# Patient Record
Sex: MALE
[De-identification: names, ages, dates, MRNs, and addresses within clinical notes are randomized per-mention and may not be internally consistent; named-entity substitution may affect disease eponyms.]

---

## 2024-02-27 PROBLEM — Z00.00 ENCOUNTER FOR PREVENTIVE HEALTH EXAMINATION: Status: ACTIVE | Noted: 2024-02-27

## 2024-02-29 ENCOUNTER — APPOINTMENT (OUTPATIENT)
Dept: ORTHOPEDIC SURGERY | Facility: CLINIC | Age: 54
End: 2024-02-29
Payer: COMMERCIAL

## 2024-02-29 VITALS
BODY MASS INDEX: 27.28 KG/M2 | WEIGHT: 180 LBS | HEART RATE: 103 BPM | OXYGEN SATURATION: 96 % | SYSTOLIC BLOOD PRESSURE: 135 MMHG | DIASTOLIC BLOOD PRESSURE: 92 MMHG | HEIGHT: 68 IN

## 2024-02-29 PROCEDURE — 72050 X-RAY EXAM NECK SPINE 4/5VWS: CPT

## 2024-02-29 PROCEDURE — 99204 OFFICE O/P NEW MOD 45 MIN: CPT

## 2024-03-05 NOTE — HISTORY OF PRESENT ILLNESS
[de-identified] : Pt presents with neck pain radiating to left shoulder and arm.  On 1/26/2024, he spun his head around and passed out on the floor briefly.  Developed left arm tingling.  Now has 6/10 pain in L arm with tingling in thumb and index finger.  Neck pain 3/10.  His left arm is weaker in PT.  Goes to gym 5-6 days a week, no overhead work.  Sees chiropractor.  Saw Dr Olmstead at [facility], hnp C5-6.  Took medrol dosepak, pain subsided.  Did PT x 5 sessions with relief.  Takes no pain meds.

## 2024-03-05 NOTE — DISCUSSION/SUMMARY
[Surgical risks reviewed] : Surgical risks reviewed [de-identified] : A lengthy discussion was held with the patient regarding his condition.  We discussed nonoperative treatment strategies including physical therapy, pharmacologic management and steroid injections.  We discussed surgical options (acdf C5-7) and the attendant benefits, alternatives, and risks, including but not limited to infection, bleeding, persistent pain, persistent neurologic deficit, neurologic injury, adjacent segment degeneration, and the need for future surgery.  The patient's questions were sought and answered satisfactorily. As pt has improved over past 6 weeks, will observe x 1 month.   I, Agatha Andrea NP am acting as a scribe for Dr. Frank Schwab.

## 2024-03-05 NOTE — PHYSICAL EXAM
[de-identified] : NVI, except L wrist flexor 4/5, biceps 4/5, triceps 3+/5. Neck pain with extension. [de-identified] : MRI C spine 2/6/2024:  C5-6 HNP, C5-6, C6-7 left foraminal stenosis  Cervical xrays with flex/ext 2/29/2024:  C6 inferior anterior osteophyte, small spondylolisthesis C6-7

## 2024-03-28 ENCOUNTER — APPOINTMENT (OUTPATIENT)
Dept: ORTHOPEDIC SURGERY | Facility: CLINIC | Age: 54
End: 2024-03-28
Payer: COMMERCIAL

## 2024-03-28 VITALS
DIASTOLIC BLOOD PRESSURE: 92 MMHG | WEIGHT: 180 LBS | SYSTOLIC BLOOD PRESSURE: 138 MMHG | HEIGHT: 68 IN | OXYGEN SATURATION: 97 % | HEART RATE: 109 BPM | BODY MASS INDEX: 27.28 KG/M2

## 2024-03-28 DIAGNOSIS — M54.2 CERVICALGIA: ICD-10-CM

## 2024-03-28 PROCEDURE — 99214 OFFICE O/P EST MOD 30 MIN: CPT

## 2024-04-02 PROBLEM — M54.2 CERVICALGIA: Status: ACTIVE | Noted: 2024-02-27

## 2024-04-02 NOTE — DISCUSSION/SUMMARY
[de-identified] : A lengthy discussion was had with the patient regarding his condition.   Referral to shoulder specialist for L shoulder pain (Darlene Contreras).  Rx PT.  F/u as needed, return of symptoms.   The patient's questions were sought and answered satisfactorily.  Agatha BACA NP am acting as a scribe for Dr. Frank Schwab.

## 2024-04-02 NOTE — HISTORY OF PRESENT ILLNESS
[de-identified] : Pt presents in f/u for neck and left arm pain.    His shoulder still bothers him a little, but he has no left arm pain.   He started PT recently, has noticed arm stronger in the gym.    2/29/2024:  Pt presents with neck pain radiating to left shoulder and arm. On 1/26/2024, he spun his head around and passed out on the floor briefly. Developed left arm tingling. Now has 6/10 pain in L arm with tingling in thumb and index finger. Neck pain 3/10. His left arm is weaker in PT. Goes to gym 5-6 days a week, no overhead work. Sees chiropractor. Saw Dr Olmstead at [facility], hnp C5-6. Took medrol dosepak, pain subsided. Did PT x 5 sessions with relief. Takes no pain meds.

## 2024-04-02 NOTE — PHYSICAL EXAM
[de-identified] : NVI, except L triceps 4/5. [de-identified] : MRI C spine 2/6/2024: C5-6 HNP, C5-6, C6-7 left foraminal stenosis  Cervical xrays with flex/ext 2/29/2024: C6 inferior anterior osteophyte, small spondylolisthesis C6-7.

## 2025-07-02 NOTE — REASON FOR VISIT
Called Pt to discuss needing a pre-op physical and he would actually like to reschedule surgery at this time. He is having some issues with his right hand still and may want to come back in to see Dr. Bonilla first, but he is not sure. I will ask our surgery schedulers to reach out to help reschedule.    Handy Crandall, CAROLINACC    [Follow-Up Visit] : a follow-up visit for [Neck Pain] : neck pain